# Patient Record
Sex: FEMALE | Race: WHITE
[De-identification: names, ages, dates, MRNs, and addresses within clinical notes are randomized per-mention and may not be internally consistent; named-entity substitution may affect disease eponyms.]

---

## 2021-10-23 ENCOUNTER — HOSPITAL ENCOUNTER (EMERGENCY)
Dept: HOSPITAL 47 - EC | Age: 36
Discharge: HOME | End: 2021-10-23
Payer: COMMERCIAL

## 2021-10-23 VITALS — RESPIRATION RATE: 16 BRPM | HEART RATE: 85 BPM

## 2021-10-23 VITALS — TEMPERATURE: 98.1 F | DIASTOLIC BLOOD PRESSURE: 87 MMHG | SYSTOLIC BLOOD PRESSURE: 137 MMHG

## 2021-10-23 DIAGNOSIS — Z90.710: ICD-10-CM

## 2021-10-23 DIAGNOSIS — S66.811A: ICD-10-CM

## 2021-10-23 DIAGNOSIS — S46.811A: Primary | ICD-10-CM
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
## 2021-10-23 DIAGNOSIS — F41.9: ICD-10-CM

## 2021-10-23 PROCEDURE — 99283 EMERGENCY DEPT VISIT LOW MDM: CPT

## 2021-10-23 NOTE — ED
Upper Extremity HPI





- General


Chief Complaint: Extremity Injury, Upper


Stated Complaint: fall/shoulder injury


Source: patient, RN notes reviewed


Mode of arrival: ambulatory


Limitations: no limitations





- History of Present Illness


Initial Comments: 


Patient is a 36-year-old female presenting to the ED for right upper extremity 

pain.  Patient states while helping sister move couch yesterday sister dropped 

couch causing patient to take all of the couches weight which caused pain in her

right shoulder and wrist area.  Patient reports history of fracture to right 

wrist as well as muscle injury to right shoulder.  Patient is most concerned 

about shoulder and voices wanting x-ray of affected shoulder.  Patient states 

she is in 8 out of 10 pain is able to move affected joints with full range of 

motion but has discomfort with the motion.  Patient denies any numbness or 

tingling or loss of sensation in affected extremity.








- Related Data


                                  Previous Rx's











 Medication  Instructions  Recorded


 


Ibuprofen [Motrin] 600 mg PO Q8HR PRN #20 tab 10/23/21











                                    Allergies











Allergy/AdvReac Type Severity Reaction Status Date / Time


 


No Known Allergies Allergy   Verified 10/23/21 11:33














Review of Systems


ROS Statement: 


Those systems with pertinent positive or pertinent negative responses have been 

documented in the HPI.





ROS Other: All systems not noted in ROS Statement are negative.





Past Medical History


Past Medical History: No Reported History


History of Any Multi-Drug Resistant Organisms: None Reported


Past Surgical History:  Section, Hysterectomy


Past Psychological History: Anxiety


Smoking Status: Never smoker


Past Alcohol Use History: Occasional


Past Drug Use History: None Reported





General Exam


Limitations: no limitations


Neck exam: Present: normal inspection, full ROM.  Absent: tenderness, 

meningismus, lymphadenopathy


Respiratory exam: Present: normal lung sounds bilaterally.  Absent: respiratory 

distress, wheezes, rales, rhonchi, stridor


Cardiovascular Exam: Present: regular rate, normal rhythm, normal heart sounds. 

 Absent: systolic murmur, diastolic murmur, rubs, gallop, clicks


Extremities exam: Present: full ROM, tenderness, normal capillary refill





Course


                                   Vital Signs











  10/23/21





  11:29


 


Temperature 98.1 F


 


Pulse Rate 73


 


Respiratory 18





Rate 


 


Blood Pressure 137/87


 


O2 Sat by Pulse 98





Oximetry 














Medical Decision Making





- Medical Decision Making


Patient presented for right-sided shoulder and wrist pain.  X-ray was obtained 

showed no acute processes.  Patient was counseled on pain relief with 

over-the-counter medication, and the rest.  Patient was provided orthopedic 

referral for continued symptoms.  Return parameters were discussed.








Disposition


Clinical Impression: 


 Right shoulder strain, Strain of wrist, right





Disposition: HOME SELF-CARE


Condition: Stable


Instructions (If sedation given, give patient instructions):  Wrist Injury (ED),

 Rotator Cuff Injury (ED)


Additional Instructions: 


Please return to the Emergency Department if symptoms worsen or any other 

concerns.


Prescriptions: 


Ibuprofen [Motrin] 600 mg PO Q8HR PRN #20 tab


 PRN Reason: Pain


Is patient prescribed a controlled substance at d/c from ED?: No


Referrals: 


Maria R Galindo DO [Primary Care Provider] - 1-2 days


Andrew Gonzalez DO [Doctor of Osteopathic Medicine] - 1-2 days


Time of Disposition: 12:53

## 2021-10-23 NOTE — XR
EXAMINATION TYPE: XR wrist complete RT, XR shoulder complete RT

 

DATE OF EXAM: 10/23/2021

 

CLINICAL HISTORY: pain

 

TECHNIQUE:  Frontal, lateral and oblique images of the right wrist are obtained. Scaphoid views also 
obtained.

 

COMPARISON: None.

 

FINDINGS:  

There is no acute fracture/dislocation evident. The joint spaces  appear within normal limits.  The o
verlying soft tissue appears unremarkable.

 

IMPRESSION:  

There is no acute fracture or dislocation seen.

 

ICD 10 NO FRACTURE, INITIAL EVALUATION

 

 

EXAMINATION TYPE: XR wrist complete RT, XR shoulder complete RT

 

DATE OF EXAM: 10/23/2021

 

CLINICAL HISTORY: pain

 

TECHNIQUE:  Three views of the right shoulder are obtained.

 

COMPARISON: None 

 

FINDINGS:  There is no acute fracture/dislocation evident.  The acromioclavicular and glenohumeral addi
int spaces appear within normal limits.  The visualized ribs are intact and unremarkable.

 

IMPRESSION: 

 

1. There is no acute fracture or dislocation. 

 

ICD 10 NO FRACTURE, INITIAL EVALUATION